# Patient Record
(demographics unavailable — no encounter records)

---

## 2024-10-31 NOTE — PLAN
[TextEntry] : -- Patient's complaint to site on her L upper posterior thigh is unrelated to her previous anal abscess - advised that anal area is entirely normal on exam today without any evidence of recurrence. -- Will send empiric Keflex given erythema and induration to area -- She has a dermatologist with whom she follows - advised to reach out to Dermatology or PCP for further evaluation  -- Follow up in [facility] clinic as needed.

## 2024-10-31 NOTE — HISTORY OF PRESENT ILLNESS
[FreeTextEntry1] : 10/31/2024: Patient presents with complaint of a painful raised bump on her L upper leg, present for at least 2 days.  Had similar area on the R leg two weeks ago but this resolved spontaneously.  No change to bowel habits.  6/28/2024: Patient presents for follow-up - feels her symptoms have entirely resolved, no complaints today.  5/20/2024: 56yoF recently s/p I&D of perianal abscess at [facility] by Dr. Palmer who presents with complaint of R sided perianal lump..  She noted a L-sided perianal lump that was treated by her dermatologist initially with intralesional steroid, then with drainage.  She had worsening of her symptoms and on 4/17/2024 she underwent bedside I&D with Dr. Palmer.  She subsequently went home on antibiotics (MRSA) and wound management included packing of the site.  Today the wound is closed and no further drainage.    No personal or family history of colorectal cancer, polyps, or IBD.  She last underwent a stool-based study for CRC screening 1-2 years ago which was negative.

## 2024-11-07 NOTE — ASSESSMENT
[FreeTextEntry1] : Incision and drainage of 1 cm left posterior upper thigh cyst performed under local anesthesia.  1% lidocaine with epinephrine local field block was placed and the area was incised with 11 blade scalpel through skin and into subcutaneous tissues.  No significant purulent drainage.  Surrounding tissues remained indurated and mildly erythematous. Already cultured by derm.  No MRSA.  continues on Doxy BID.  Packed with 1/4 iodoform.  Remove tomorrow and cover with simple dry dressing. Complete course of oral antibiotics as previously prescribed f/u one week.

## 2024-11-07 NOTE — PHYSICAL EXAM
[Normal Breath Sounds] : Normal breath sounds [Normal Heart Sounds] : normal heart sounds [Normal Rate and Rhythm] : normal rate and rhythm [No Rash or Lesion] : No rash or lesion [Alert] : alert [Oriented to Person] : oriented to person [Oriented to Place] : oriented to place [Oriented to Time] : oriented to time [Calm] : calm [de-identified] : Healthy woman in no distress [de-identified] : CHARLI BECKER EOMI [de-identified] : Soft, nontender nondistended, positive bowel sounds in all four quads.  No hernia or masses. No rebound or guarding. [de-identified] : Ambulating without difficulty or assistance [de-identified] : 2 cm area of induration with clear draining sinus/punctum.  Posterior proximal thigh inferior gluteal fold

## 2024-11-07 NOTE — HISTORY OF PRESENT ILLNESS
[de-identified] : Known to me from previous incision and drainage of perianal abscess.  Patient returns today with complaints of a posterior thigh abscess seen by both her colorectal surgeon as well as her dermatologist.

## 2024-11-07 NOTE — HISTORY OF PRESENT ILLNESS
[de-identified] : Known to me from previous incision and drainage of perianal abscess.  Patient returns today with complaints of a posterior thigh abscess seen by both her colorectal surgeon as well as her dermatologist.

## 2024-11-12 NOTE — ASSESSMENT
[FreeTextEntry1] : Status post incision and drainage of left proximal posterior thigh abscess.  Healing nicely.  No further surgical intervention indicated.  Patient should take antibiotics to completion.  Continue local wound care.  Follow-up as needed

## 2024-11-12 NOTE — HISTORY OF PRESENT ILLNESS
[de-identified] : Known to me from previous incision and drainage of perianal abscess.  Patient returns today with complaints of a posterior thigh abscess seen by both her colorectal surgeon as well as her dermatologist.

## 2024-11-12 NOTE — PHYSICAL EXAM
[Normal Breath Sounds] : Normal breath sounds [Normal Heart Sounds] : normal heart sounds [Normal Rate and Rhythm] : normal rate and rhythm [No Rash or Lesion] : No rash or lesion [Alert] : alert [Oriented to Person] : oriented to person [Oriented to Place] : oriented to place [Oriented to Time] : oriented to time [Calm] : calm [de-identified] : Healthy woman in no distress [de-identified] : CHARLI BECKER EOMI [de-identified] : Soft, nontender nondistended, positive bowel sounds in all four quads.  No hernia or masses. No rebound or guarding. [de-identified] : Ambulating without difficulty or assistance [de-identified] : Cellulitis, induration has completely resolved.  Area of incision and drainage appears to be healing appropriately with no signs of super infection